# Patient Record
Sex: MALE | Race: WHITE | Employment: FULL TIME | URBAN - METROPOLITAN AREA
[De-identification: names, ages, dates, MRNs, and addresses within clinical notes are randomized per-mention and may not be internally consistent; named-entity substitution may affect disease eponyms.]

---

## 2019-05-15 ENCOUNTER — APPOINTMENT (OUTPATIENT)
Dept: CT IMAGING | Age: 69
DRG: 670 | End: 2019-05-15
Payer: MEDICARE

## 2019-05-15 ENCOUNTER — HOSPITAL ENCOUNTER (INPATIENT)
Age: 69
LOS: 2 days | Discharge: HOME | DRG: 670 | End: 2019-05-17
Attending: EMERGENCY MEDICINE | Admitting: FAMILY MEDICINE
Payer: MEDICARE

## 2019-05-15 DIAGNOSIS — N21.0 BLADDER STONES: ICD-10-CM

## 2019-05-15 DIAGNOSIS — N20.0 KIDNEY STONE: Primary | ICD-10-CM

## 2019-05-15 PROBLEM — K59.00 CONSTIPATION: Status: ACTIVE | Noted: 2019-05-15

## 2019-05-15 PROBLEM — I10 HTN (HYPERTENSION): Status: ACTIVE | Noted: 2019-05-15

## 2019-05-15 PROBLEM — I25.10 CAD (CORONARY ARTERY DISEASE): Status: ACTIVE | Noted: 2019-05-15

## 2019-05-15 PROBLEM — N40.0 BPH (BENIGN PROSTATIC HYPERPLASIA): Status: ACTIVE | Noted: 2019-05-15

## 2019-05-15 PROBLEM — M51.36 DDD (DEGENERATIVE DISC DISEASE), LUMBAR: Status: ACTIVE | Noted: 2019-05-15

## 2019-05-15 LAB
A/G RATIO: 1.5 (ref 1.1–2.2)
ALBUMIN SERPL-MCNC: 4.3 G/DL (ref 3.4–5)
ALP BLD-CCNC: 53 U/L (ref 40–129)
ALT SERPL-CCNC: 28 U/L (ref 10–40)
ANION GAP SERPL CALCULATED.3IONS-SCNC: 13 MMOL/L (ref 3–16)
AST SERPL-CCNC: 23 U/L (ref 15–37)
BASOPHILS ABSOLUTE: 0 K/UL (ref 0–0.2)
BASOPHILS RELATIVE PERCENT: 0.1 %
BILIRUB SERPL-MCNC: 0.3 MG/DL (ref 0–1)
BILIRUBIN URINE: NEGATIVE
BLOOD, URINE: ABNORMAL
BUN BLDV-MCNC: 27 MG/DL (ref 7–20)
CALCIUM SERPL-MCNC: 9.8 MG/DL (ref 8.3–10.6)
CHLORIDE BLD-SCNC: 100 MMOL/L (ref 99–110)
CLARITY: CLEAR
CO2: 23 MMOL/L (ref 21–32)
COLOR: YELLOW
CREAT SERPL-MCNC: 1.2 MG/DL (ref 0.8–1.3)
CRYSTALS, UA: ABNORMAL /HPF
EOSINOPHILS ABSOLUTE: 0 K/UL (ref 0–0.6)
EOSINOPHILS RELATIVE PERCENT: 0 %
EPITHELIAL CELLS, UA: 1 /HPF (ref 0–5)
GFR AFRICAN AMERICAN: >60
GFR NON-AFRICAN AMERICAN: >60
GLOBULIN: 2.8 G/DL
GLUCOSE BLD-MCNC: 168 MG/DL (ref 70–99)
GLUCOSE URINE: 100 MG/DL
HCT VFR BLD CALC: 44.7 % (ref 40.5–52.5)
HEMOGLOBIN: 14.7 G/DL (ref 13.5–17.5)
HYALINE CASTS: 1 /LPF (ref 0–8)
KETONES, URINE: NEGATIVE MG/DL
LEUKOCYTE ESTERASE, URINE: NEGATIVE
LIPASE: 235 U/L (ref 13–60)
LYMPHOCYTES ABSOLUTE: 0.6 K/UL (ref 1–5.1)
LYMPHOCYTES RELATIVE PERCENT: 5.6 %
MCH RBC QN AUTO: 29.9 PG (ref 26–34)
MCHC RBC AUTO-ENTMCNC: 32.8 G/DL (ref 31–36)
MCV RBC AUTO: 91 FL (ref 80–100)
MICROSCOPIC EXAMINATION: YES
MONOCYTES ABSOLUTE: 0.5 K/UL (ref 0–1.3)
MONOCYTES RELATIVE PERCENT: 4.8 %
NEUTROPHILS ABSOLUTE: 8.8 K/UL (ref 1.7–7.7)
NEUTROPHILS RELATIVE PERCENT: 89.5 %
NITRITE, URINE: NEGATIVE
PDW BLD-RTO: 14.2 % (ref 12.4–15.4)
PH UA: 6.5 (ref 5–8)
PLATELET # BLD: 309 K/UL (ref 135–450)
PMV BLD AUTO: 7.7 FL (ref 5–10.5)
POTASSIUM REFLEX MAGNESIUM: 4.1 MMOL/L (ref 3.5–5.1)
PROTEIN UA: NEGATIVE MG/DL
RBC # BLD: 4.91 M/UL (ref 4.2–5.9)
RBC UA: 2 /HPF (ref 0–4)
SODIUM BLD-SCNC: 136 MMOL/L (ref 136–145)
SPECIFIC GRAVITY UA: 1.02 (ref 1–1.03)
TOTAL PROTEIN: 7.1 G/DL (ref 6.4–8.2)
URINE TYPE: ABNORMAL
UROBILINOGEN, URINE: 0.2 E.U./DL
WBC # BLD: 9.9 K/UL (ref 4–11)
WBC UA: 1 /HPF (ref 0–5)

## 2019-05-15 PROCEDURE — 83690 ASSAY OF LIPASE: CPT

## 2019-05-15 PROCEDURE — 81001 URINALYSIS AUTO W/SCOPE: CPT

## 2019-05-15 PROCEDURE — 6370000000 HC RX 637 (ALT 250 FOR IP)

## 2019-05-15 PROCEDURE — 96376 TX/PRO/DX INJ SAME DRUG ADON: CPT

## 2019-05-15 PROCEDURE — 1200000000 HC SEMI PRIVATE

## 2019-05-15 PROCEDURE — 6370000000 HC RX 637 (ALT 250 FOR IP): Performed by: FAMILY MEDICINE

## 2019-05-15 PROCEDURE — 80053 COMPREHEN METABOLIC PANEL: CPT

## 2019-05-15 PROCEDURE — 99285 EMERGENCY DEPT VISIT HI MDM: CPT

## 2019-05-15 PROCEDURE — 96361 HYDRATE IV INFUSION ADD-ON: CPT

## 2019-05-15 PROCEDURE — 85025 COMPLETE CBC W/AUTO DIFF WBC: CPT

## 2019-05-15 PROCEDURE — 6360000004 HC RX CONTRAST MEDICATION: Performed by: EMERGENCY MEDICINE

## 2019-05-15 PROCEDURE — 6360000002 HC RX W HCPCS: Performed by: FAMILY MEDICINE

## 2019-05-15 PROCEDURE — 96374 THER/PROPH/DIAG INJ IV PUSH: CPT

## 2019-05-15 PROCEDURE — 6360000002 HC RX W HCPCS: Performed by: EMERGENCY MEDICINE

## 2019-05-15 PROCEDURE — 94760 N-INVAS EAR/PLS OXIMETRY 1: CPT

## 2019-05-15 PROCEDURE — 2580000003 HC RX 258: Performed by: FAMILY MEDICINE

## 2019-05-15 PROCEDURE — 96375 TX/PRO/DX INJ NEW DRUG ADDON: CPT

## 2019-05-15 PROCEDURE — 2580000003 HC RX 258: Performed by: EMERGENCY MEDICINE

## 2019-05-15 PROCEDURE — 74177 CT ABD & PELVIS W/CONTRAST: CPT

## 2019-05-15 RX ORDER — SODIUM CHLORIDE 0.9 % (FLUSH) 0.9 %
10 SYRINGE (ML) INJECTION EVERY 12 HOURS SCHEDULED
Status: DISCONTINUED | OUTPATIENT
Start: 2019-05-15 | End: 2019-05-17 | Stop reason: HOSPADM

## 2019-05-15 RX ORDER — FENOFIBRATE 160 MG/1
160 TABLET ORAL DAILY
Status: DISCONTINUED | OUTPATIENT
Start: 2019-05-15 | End: 2019-05-17 | Stop reason: HOSPADM

## 2019-05-15 RX ORDER — CLOPIDOGREL BISULFATE 75 MG/1
75 TABLET ORAL DAILY
COMMUNITY

## 2019-05-15 RX ORDER — CYCLOBENZAPRINE HCL 10 MG
10 TABLET ORAL 3 TIMES DAILY PRN
COMMUNITY

## 2019-05-15 RX ORDER — PREDNISONE 1 MG/1
1 TABLET ORAL DAILY
COMMUNITY
End: 2019-05-15 | Stop reason: DRUGHIGH

## 2019-05-15 RX ORDER — POLYETHYLENE GLYCOL 3350 17 G/17G
17 POWDER, FOR SOLUTION ORAL
Status: DISCONTINUED | OUTPATIENT
Start: 2019-05-15 | End: 2019-05-17 | Stop reason: HOSPADM

## 2019-05-15 RX ORDER — AMLODIPINE BESYLATE 5 MG/1
10 TABLET ORAL DAILY
Status: DISCONTINUED | OUTPATIENT
Start: 2019-05-16 | End: 2019-05-17

## 2019-05-15 RX ORDER — HYDROCODONE BITARTRATE AND ACETAMINOPHEN 5; 325 MG/1; MG/1
1 TABLET ORAL EVERY 4 HOURS PRN
Status: DISCONTINUED | OUTPATIENT
Start: 2019-05-15 | End: 2019-05-17 | Stop reason: HOSPADM

## 2019-05-15 RX ORDER — HYDROCODONE BITARTRATE AND ACETAMINOPHEN 5; 325 MG/1; MG/1
2 TABLET ORAL EVERY 4 HOURS PRN
Status: DISCONTINUED | OUTPATIENT
Start: 2019-05-15 | End: 2019-05-17 | Stop reason: HOSPADM

## 2019-05-15 RX ORDER — TAMSULOSIN HYDROCHLORIDE 0.4 MG/1
CAPSULE ORAL
Status: COMPLETED
Start: 2019-05-15 | End: 2019-05-15

## 2019-05-15 RX ORDER — LOSARTAN POTASSIUM 100 MG/1
100 TABLET ORAL DAILY
Status: DISCONTINUED | OUTPATIENT
Start: 2019-05-15 | End: 2019-05-17 | Stop reason: HOSPADM

## 2019-05-15 RX ORDER — METOPROLOL TARTRATE 50 MG/1
50 TABLET, FILM COATED ORAL 2 TIMES DAILY
Status: DISCONTINUED | OUTPATIENT
Start: 2019-05-15 | End: 2019-05-15

## 2019-05-15 RX ORDER — LOSARTAN POTASSIUM 100 MG/1
100 TABLET ORAL DAILY
COMMUNITY

## 2019-05-15 RX ORDER — PREDNISONE 1 MG/1
5 TABLET ORAL SEE ADMIN INSTRUCTIONS
COMMUNITY

## 2019-05-15 RX ORDER — ATORVASTATIN CALCIUM 40 MG/1
40 TABLET, FILM COATED ORAL DAILY
COMMUNITY

## 2019-05-15 RX ORDER — MORPHINE SULFATE 4 MG/ML
4 INJECTION, SOLUTION INTRAMUSCULAR; INTRAVENOUS ONCE
Status: COMPLETED | OUTPATIENT
Start: 2019-05-15 | End: 2019-05-15

## 2019-05-15 RX ORDER — CLOPIDOGREL BISULFATE 75 MG/1
75 TABLET ORAL DAILY
Status: DISCONTINUED | OUTPATIENT
Start: 2019-05-15 | End: 2019-05-17 | Stop reason: HOSPADM

## 2019-05-15 RX ORDER — HYDRALAZINE HYDROCHLORIDE 20 MG/ML
10 INJECTION INTRAMUSCULAR; INTRAVENOUS EVERY 6 HOURS PRN
Status: DISCONTINUED | OUTPATIENT
Start: 2019-05-15 | End: 2019-05-17 | Stop reason: HOSPADM

## 2019-05-15 RX ORDER — PREDNISONE 1 MG/1
5 TABLET ORAL SEE ADMIN INSTRUCTIONS
Status: DISCONTINUED | OUTPATIENT
Start: 2019-05-15 | End: 2019-05-15

## 2019-05-15 RX ORDER — FENOFIBRATE 160 MG/1
160 TABLET ORAL DAILY
COMMUNITY

## 2019-05-15 RX ORDER — ONDANSETRON 2 MG/ML
4 INJECTION INTRAMUSCULAR; INTRAVENOUS ONCE
Status: COMPLETED | OUTPATIENT
Start: 2019-05-15 | End: 2019-05-15

## 2019-05-15 RX ORDER — CYCLOBENZAPRINE HCL 10 MG
10 TABLET ORAL 3 TIMES DAILY PRN
Status: DISCONTINUED | OUTPATIENT
Start: 2019-05-15 | End: 2019-05-17 | Stop reason: HOSPADM

## 2019-05-15 RX ORDER — AMLODIPINE BESYLATE 5 MG/1
5 TABLET ORAL DAILY
Status: DISCONTINUED | OUTPATIENT
Start: 2019-05-15 | End: 2019-05-15

## 2019-05-15 RX ORDER — ONDANSETRON 2 MG/ML
4 INJECTION INTRAMUSCULAR; INTRAVENOUS EVERY 6 HOURS PRN
Status: DISCONTINUED | OUTPATIENT
Start: 2019-05-15 | End: 2019-05-17 | Stop reason: HOSPADM

## 2019-05-15 RX ORDER — MORPHINE SULFATE 4 MG/ML
4 INJECTION, SOLUTION INTRAMUSCULAR; INTRAVENOUS
Status: DISCONTINUED | OUTPATIENT
Start: 2019-05-15 | End: 2019-05-17 | Stop reason: HOSPADM

## 2019-05-15 RX ORDER — SODIUM CHLORIDE 9 MG/ML
INJECTION, SOLUTION INTRAVENOUS CONTINUOUS
Status: DISCONTINUED | OUTPATIENT
Start: 2019-05-15 | End: 2019-05-16

## 2019-05-15 RX ORDER — TAMSULOSIN HYDROCHLORIDE 0.4 MG/1
0.4 CAPSULE ORAL DAILY
Status: DISCONTINUED | OUTPATIENT
Start: 2019-05-16 | End: 2019-05-16

## 2019-05-15 RX ORDER — MORPHINE SULFATE 2 MG/ML
2 INJECTION, SOLUTION INTRAMUSCULAR; INTRAVENOUS
Status: DISCONTINUED | OUTPATIENT
Start: 2019-05-15 | End: 2019-05-17 | Stop reason: HOSPADM

## 2019-05-15 RX ORDER — ATORVASTATIN CALCIUM 40 MG/1
40 TABLET, FILM COATED ORAL NIGHTLY
Status: DISCONTINUED | OUTPATIENT
Start: 2019-05-15 | End: 2019-05-17 | Stop reason: HOSPADM

## 2019-05-15 RX ORDER — DOXAZOSIN MESYLATE 4 MG/1
4 TABLET ORAL NIGHTLY
COMMUNITY

## 2019-05-15 RX ORDER — TAMSULOSIN HYDROCHLORIDE 0.4 MG/1
0.8 CAPSULE ORAL ONCE
Status: COMPLETED | OUTPATIENT
Start: 2019-05-15 | End: 2019-05-15

## 2019-05-15 RX ORDER — PREDNISONE 10 MG/1
10 TABLET ORAL DAILY
Status: COMPLETED | OUTPATIENT
Start: 2019-05-15 | End: 2019-05-17

## 2019-05-15 RX ORDER — SODIUM CHLORIDE 0.9 % (FLUSH) 0.9 %
10 SYRINGE (ML) INJECTION PRN
Status: DISCONTINUED | OUTPATIENT
Start: 2019-05-15 | End: 2019-05-17 | Stop reason: HOSPADM

## 2019-05-15 RX ORDER — METOPROLOL TARTRATE 50 MG/1
50 TABLET, FILM COATED ORAL DAILY
COMMUNITY

## 2019-05-15 RX ORDER — ACETAMINOPHEN 325 MG/1
650 TABLET ORAL EVERY 4 HOURS PRN
Status: DISCONTINUED | OUTPATIENT
Start: 2019-05-15 | End: 2019-05-17 | Stop reason: HOSPADM

## 2019-05-15 RX ORDER — SENNA AND DOCUSATE SODIUM 50; 8.6 MG/1; MG/1
2 TABLET, FILM COATED ORAL 2 TIMES DAILY
Status: COMPLETED | OUTPATIENT
Start: 2019-05-15 | End: 2019-05-16

## 2019-05-15 RX ORDER — 0.9 % SODIUM CHLORIDE 0.9 %
1000 INTRAVENOUS SOLUTION INTRAVENOUS ONCE
Status: COMPLETED | OUTPATIENT
Start: 2019-05-15 | End: 2019-05-15

## 2019-05-15 RX ORDER — AMLODIPINE BESYLATE 5 MG/1
5 TABLET ORAL DAILY
COMMUNITY

## 2019-05-15 RX ADMIN — SODIUM CHLORIDE 1000 ML: 9 INJECTION, SOLUTION INTRAVENOUS at 08:48

## 2019-05-15 RX ADMIN — Medication 10 ML: at 20:59

## 2019-05-15 RX ADMIN — POLYETHYLENE GLYCOL 3350 17 G: 17 POWDER, FOR SOLUTION ORAL at 14:12

## 2019-05-15 RX ADMIN — MAGNESIUM HYDROXIDE 30 ML: 400 SUSPENSION ORAL at 18:46

## 2019-05-15 RX ADMIN — CLOPIDOGREL 75 MG: 75 TABLET, FILM COATED ORAL at 14:02

## 2019-05-15 RX ADMIN — MORPHINE SULFATE 4 MG: 4 INJECTION INTRAVENOUS at 10:52

## 2019-05-15 RX ADMIN — MORPHINE SULFATE 2 MG: 2 INJECTION, SOLUTION INTRAMUSCULAR; INTRAVENOUS at 14:12

## 2019-05-15 RX ADMIN — IOPAMIDOL 75 ML: 755 INJECTION, SOLUTION INTRAVENOUS at 09:40

## 2019-05-15 RX ADMIN — ENOXAPARIN SODIUM 40 MG: 40 INJECTION SUBCUTANEOUS at 20:59

## 2019-05-15 RX ADMIN — Medication 10 ML: at 15:54

## 2019-05-15 RX ADMIN — MORPHINE SULFATE 4 MG: 4 INJECTION INTRAVENOUS at 08:48

## 2019-05-15 RX ADMIN — TAMSULOSIN HYDROCHLORIDE 0.8 MG: 0.4 CAPSULE ORAL at 12:23

## 2019-05-15 RX ADMIN — FENOFIBRATE 160 MG: 160 TABLET ORAL at 14:02

## 2019-05-15 RX ADMIN — PREDNISONE 10 MG: 10 TABLET ORAL at 15:59

## 2019-05-15 RX ADMIN — Medication 10 ML: at 14:03

## 2019-05-15 RX ADMIN — ONDANSETRON 4 MG: 2 INJECTION INTRAMUSCULAR; INTRAVENOUS at 08:48

## 2019-05-15 RX ADMIN — LOSARTAN POTASSIUM 100 MG: 100 TABLET, FILM COATED ORAL at 14:03

## 2019-05-15 RX ADMIN — HYDRALAZINE HYDROCHLORIDE 10 MG: 20 INJECTION INTRAMUSCULAR; INTRAVENOUS at 15:53

## 2019-05-15 RX ADMIN — SODIUM CHLORIDE: 9 INJECTION, SOLUTION INTRAVENOUS at 14:13

## 2019-05-15 RX ADMIN — DESMOPRESSIN ACETATE 40 MG: 0.2 TABLET ORAL at 20:59

## 2019-05-15 RX ADMIN — SENNOSIDES AND DOCUSATE SODIUM 2 TABLET: 8.6; 5 TABLET ORAL at 21:00

## 2019-05-15 RX ADMIN — AMLODIPINE BESYLATE 5 MG: 5 TABLET ORAL at 14:02

## 2019-05-15 SDOH — HEALTH STABILITY: MENTAL HEALTH: HOW OFTEN DO YOU HAVE A DRINK CONTAINING ALCOHOL?: NEVER

## 2019-05-15 ASSESSMENT — PAIN SCALES - GENERAL
PAINLEVEL_OUTOF10: 7
PAINLEVEL_OUTOF10: 0
PAINLEVEL_OUTOF10: 2
PAINLEVEL_OUTOF10: 7
PAINLEVEL_OUTOF10: 0
PAINLEVEL_OUTOF10: 6
PAINLEVEL_OUTOF10: 0
PAINLEVEL_OUTOF10: 3
PAINLEVEL_OUTOF10: 4
PAINLEVEL_OUTOF10: 0
PAINLEVEL_OUTOF10: 8
PAINLEVEL_OUTOF10: 7
PAINLEVEL_OUTOF10: 0
PAINLEVEL_OUTOF10: 0

## 2019-05-15 ASSESSMENT — PAIN DESCRIPTION - PAIN TYPE
TYPE: ACUTE PAIN

## 2019-05-15 ASSESSMENT — PAIN DESCRIPTION - DESCRIPTORS
DESCRIPTORS: DISCOMFORT
DESCRIPTORS: DISCOMFORT

## 2019-05-15 ASSESSMENT — PAIN DESCRIPTION - PROGRESSION
CLINICAL_PROGRESSION: RAPIDLY IMPROVING
CLINICAL_PROGRESSION: GRADUALLY IMPROVING

## 2019-05-15 ASSESSMENT — PAIN DESCRIPTION - LOCATION
LOCATION: ABDOMEN

## 2019-05-15 ASSESSMENT — PAIN DESCRIPTION - FREQUENCY: FREQUENCY: INTERMITTENT

## 2019-05-15 ASSESSMENT — PAIN DESCRIPTION - ORIENTATION
ORIENTATION: LEFT

## 2019-05-15 ASSESSMENT — PAIN - FUNCTIONAL ASSESSMENT: PAIN_FUNCTIONAL_ASSESSMENT: 0-10

## 2019-05-15 NOTE — ED NOTES
Pharmacy Medication History Note      List of current medications patient is taking is complete. Source of information: [employer]    Changes made to medication list:  Medications flagged for removal (include reason, ex. noncompliance):  none    Medications removed (include reason, ex. therapy complete or physician discontinued):  Prednisone- dose adjustment    Medications added/doses adjusted:  Prednisone taper- started 5/10/19    Other notes (ex. Recent course of antibiotics, Coumadin dosing):  Denies use of other OTC or herbal medications. Last dose times updated. Levi Ramirez, PharmD  ED Pharmacist [id number]      No current facility-administered medications on file prior to encounter. Current Outpatient Medications on File Prior to Encounter   Medication Sig Dispense Refill    losartan (COZAAR) 100 MG tablet Take 100 mg by mouth daily      doxazosin (CARDURA) 4 MG tablet Take 4 mg by mouth nightly      amLODIPine (NORVASC) 5 MG tablet Take 5 mg by mouth daily      fenofibrate 160 MG tablet Take 160 mg by mouth daily      clopidogrel (PLAVIX) 75 MG tablet Take 75 mg by mouth daily      atorvastatin (LIPITOR) 40 MG tablet Take 40 mg by mouth daily      metoprolol tartrate (LOPRESSOR) 50 MG tablet Take 50 mg by mouth 2 times daily      cyclobenzaprine (FLEXERIL) 10 MG tablet Take 10 mg by mouth 3 times daily as needed for Muscle spasms      predniSONE (DELTASONE) 5 MG tablet Take 5 mg by mouth See Admin Instructions Per prednisone taper instructions.       [DISCONTINUED] predniSONE (DELTASONE) 1 MG tablet Take 1 mg by mouth daily

## 2019-05-15 NOTE — CONSULTS
CC:   Chief Complaint   Patient presents with    Flank Pain     left flank pain worsening last night. hx of divertic. HPI: Jose Carlos Vyas is a 80 y.o. male. I saw the patient today for left ureter stone, bladder stones, BPH, and associated symptoms of flank pain, that have been present for  1 day. Symptoms relieved by pain meds and aggravated by noth. He has tried the following treatments: noth. Past medical History:   He has a past medical history of Hyperlipidemia and Hypertension. Past Surgical History:  He has a past surgical history that includes Cardiac surgery. Allergies: Allergies   Allergen Reactions    Asa [Aspirin] Anaphylaxis       Social History:  He reports that he has never smoked. He has never used smokeless tobacco. He reports that he does not drink alcohol. Family History:  family history is not on file. Medications:   Scheduled Meds:  Continuous Infusions:  PRN Meds:    Review of Systems:  Constitutional: Negative for fever    Genitourinary: see HPI  Eyes: negative for sudden change in vision  EENT: no complaints  Cardiovascular: Negative for chest pain  Respiratory: Negative for shortness of breath  Gastrointestinal: Negative for nausea  Musculoskeletal: + back pain   Neurological: Negative for weakness  Psychiatric: Negative for anxiety  Integumentary: Negative for rashes or adenopathy     Physical Exam:  Vitals:    05/15/19 0900   BP: (!) 194/80   Pulse: 65   Resp: 20   Temp:    SpO2: (!) 88%     Constitutional: NAD, well-developed, well-nourished. HEENT: MMM. Hearing intact. PERRL  Neck: no thyroid masses appreciated. Trachea is midline. Neck appears unremarkable   Lymph: no palpable adenopathy in supraclavicular, or axillary lymph nodes  Cardiovascular: Regular rate. No peripheral edema  Respiratory: Respirations are even and non-labored. No audible breath sounds. Genitourinary:no CVAT, ERAN def per pt  Abdomen: Soft.  No distension, tenderness, hernias, masses or guarding. No CVA tenderness. No hernias appreciated. Liver and spleen appear normal  Psychiatric: A + O x 3, normal affect. Insight appears intact. Muskuloskeletal: REINIER x 4   Skin: Pink, warm and dry. No rashes on face and arms.     Labs:  Lab Results   Component Value Date    WBC 9.9 05/15/2019    HGB 14.7 05/15/2019    HCT 44.7 05/15/2019    MCV 91.0 05/15/2019     05/15/2019     Lab Results   Component Value Date    CREATININE 1.2 05/15/2019    BUN 27 (H) 05/15/2019     05/15/2019    K 4.1 05/15/2019     05/15/2019    CO2 23 05/15/2019     No results found for: PSA     Imaging:   reviewed    Татьяна Gerber MD  5/15/2019

## 2019-05-15 NOTE — ED NOTES
Report given to [employer] and Dwaine Crabtree RN from . Patient alert and oriented. Patient transferred to floor by [employer] and Dwaine Crabtree RN from  in wheelchair. Pt states wife has his wallet, keys and cellphone. pts wife is carrying 1 purse and 1 bag on shoulder. pts home clothing in belongings bag and transported with pt to . Skin appropriate for ethnicity, dry and intact. No signs of distress. Regular respiratory pattern, normal respiratory depth, unlabored respirations.          Tika Desai RN  05/15/19 9845

## 2019-05-15 NOTE — ED NOTES
Pt states abdominal pain started last night 2300. Nausea, denies v/d. New hx of DDD lumbar and lumbar nerve issues. Pt lives out of town. Wife at bedside. Pt is on a continuous pulse oximetry and telemetry monitoring. Pt continued on cycling blood pressure. Fall risk precautions in place, call light in reach, bed side table within reach, will continue to monitor. Warm blanket provided to pt.           Michell Nobles RN  05/15/19 9658

## 2019-05-15 NOTE — FLOWSHEET NOTE
05/15/19 1545   Vital Signs   Temp 97.6 °F (36.4 °C)   Temp Source Temporal   Pulse 82   Heart Rate Source Monitor   Resp 18   BP (!) 168/78   BP Location Right upper arm   BP Upper/Lower Upper     BP elevated. Gave PRN Hydralazine. Will monitor.

## 2019-05-15 NOTE — LETTER
[address] [address]  Phone: [phone]    General Surgery Department  Surgeon: Dr. Susanne Lynn      May 16, 2019     Patient: Stefanie Boxer   YOB: 1950   Date of Visit: 5/15/2019       To Whom it May Concern:    Carine Fregoso was seen at [facility] for a surgical procedure today. If you have any questions or concerns, please don't hesitate to call.     Sincerely,         Merary Key RN, BSN

## 2019-05-15 NOTE — H&P
Historical Provider, MD   predniSONE (DELTASONE) 5 MG tablet Take 5 mg by mouth See Admin Instructions Per prednisone taper instructions. Yes Historical Provider, MD       Allergy(ies):  Hannah Rands [aspirin]    Social History:  TOBACCO:  reports that he has never smoked. He has never used smokeless tobacco.  ETOH:  reports that he does not drink alcohol. Family History:  History reviewed. No pertinent family history. Review of Systems:  All other systems are reviewed, positive and negative are noted in HPI. Vitals and physical examination:  BP (!) 162/76   Pulse 58   Temp 97.6 °F (36.4 °C) (Temporal)   Resp 20   Ht 5' 9\" (1.753 m)   Wt 197 lb 12.8 oz (89.7 kg)   SpO2 97%   BMI 29.21 kg/m²   Gen/overall appearance: Not in acute distress. Alert. Head: Normocephalic, atraumatic  Eyes: EOMI, good acuity  ENT:- Oral mucosa moist  Neck: No JVD, thyromegaly  CVS: Nml S1S2, no MRG, RRR  Pulm: Clear bilaterally. No crackles/wheezes  Gastrointestinal: Soft, NT/ND, +BS  Musculoskeletal: No edema. Warm  Neuro: No focal deficit. Moves extremity spontaneously. Psychiatry: Appropriate affect. Not agitated. Skin: Warm, dry with normal turgor. No rash    Labs/imaging/EKG:  CBC:   Recent Labs     05/15/19  0851   WBC 9.9   HGB 14.7        BMP:    Recent Labs     05/15/19  0851      K 4.1      CO2 23   BUN 27*   CREATININE 1.2   GLUCOSE 168*     Hepatic:   Recent Labs     05/15/19  0851   AST 23   ALT 28   BILITOT 0.3   ALKPHOS 53       Ct Abdomen Pelvis W Iv Contrast Additional Contrast? None    Result Date: 5/15/2019  EXAMINATION: CT OF THE ABDOMEN AND PELVIS WITH CONTRAST 5/15/2019 9:40 am TECHNIQUE: CT of the abdomen and pelvis was performed with the administration of intravenous contrast. Multiplanar reformatted images are provided for review.  Dose modulation, iterative reconstruction, and/or weight based adjustment of the mA/kV was utilized to reduce the radiation dose to as low as reasonably w/ left hydronephrosis  - Urology following, plan for OR tomm am  - flomax, IVF, prn pain meds      HTN  - BP variable, increase norvasc to 10mg QD, cont losartan 100mg QD, decrease lopressor to 12.5mg BID, hold doxazosin while on flomax, prn hyralzine, CTM      CAD  - on plavix, statin, BB      BPH  - started on flomax      DDD  - complete steroid course      Constipation  - start laxatives       Activities: Up with assist  Prophylaxis: lovenox  Code status: Full    ==========================================================          Thank you No primary care provider on file. for the opportunity to be involved in this patient's care.  If you have any questions or concerns please feel free to contact me at [phone].  -----------------------------  June Feliciano MD  [facility] hospitalist

## 2019-05-15 NOTE — ED PROVIDER NOTES
CHIEF COMPLAINT  Flank Pain (left flank pain worsening last night. hx of divertic.)      HISTORY OF PRESENT ILLNESS  Jeremy Ramey is a 80 y.o. male presents to the ED with ppain in his left side. 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 A recent MRI done in [place] where he lives revealed  Some degenerative changes. Last night he started feeling nauseous, has had  Pain in the slightly different area in the left flank and abdominal fullness wrapping around to his left abdomen. He consulted with his doctor in the concern was for diverticulitis or kidney stone. Reginaldo Tena He's had diverticulitis in the past before, no kidney stones. He felt a little warm, no definite fever. He had nausea but no vomiting. He has been constipated recently. He has had no rash. No other complaints, modifying factors or associated symptoms. I have reviewed the following from the nursing documentation. No past medical history on file. No past surgical history on file. No family history on file.   Social History     Socioeconomic History    Marital status: Not on file     Spouse name: Not on file    Number of children: Not on file    Years of education: Not on file    Highest education level: Not on file   Occupational History    Not on file   Social Needs    Financial resource strain: Not on file    Food insecurity:     Worry: Not on file     Inability: Not on file    Transportation needs:     Medical: Not on file     Non-medical: Not on file   Tobacco Use    Smoking status: Not on file   Substance and Sexual Activity    Alcohol use: Not on file    Drug use: Not on file    Sexual activity: Not on file   Lifestyle    Physical activity:     Days per week: Not on file     Minutes per session: Not on file    Stress: Not on file   Relationships    Social connections:     Talks on phone: Not on file     Gets together: Not on file hospital encounter of 05/15/19   CBC Auto Differential   Result Value Ref Range    WBC 9.9 4.0 - 11.0 K/uL    RBC 4.91 4.20 - 5.90 M/uL    Hemoglobin 14.7 13.5 - 17.5 g/dL    Hematocrit 44.7 40.5 - 52.5 %    MCV 91.0 80.0 - 100.0 fL    MCH 29.9 26.0 - 34.0 pg    MCHC 32.8 31.0 - 36.0 g/dL    RDW 14.2 12.4 - 15.4 %    Platelets 050 779 - 673 K/uL    MPV 7.7 5.0 - 10.5 fL    Neutrophils % 89.5 %    Lymphocytes % 5.6 %    Monocytes % 4.8 %    Eosinophils % 0.0 %    Basophils % 0.1 %    Neutrophils # 8.8 (H) 1.7 - 7.7 K/uL    Lymphocytes # 0.6 (L) 1.0 - 5.1 K/uL    Monocytes # 0.5 0.0 - 1.3 K/uL    Eosinophils # 0.0 0.0 - 0.6 K/uL    Basophils # 0.0 0.0 - 0.2 K/uL   Comprehensive Metabolic Panel w/ Reflex to MG   Result Value Ref Range    Sodium 136 136 - 145 mmol/L    Potassium reflex Magnesium 4.1 3.5 - 5.1 mmol/L    Chloride 100 99 - 110 mmol/L    CO2 23 21 - 32 mmol/L    Anion Gap 13 3 - 16    Glucose 168 (H) 70 - 99 mg/dL    BUN 27 (H) 7 - 20 mg/dL    CREATININE 1.2 0.8 - 1.3 mg/dL    GFR Non-African American >60 >60    GFR African American >60 >60    Calcium 9.8 8.3 - 10.6 mg/dL    Total Protein 7.1 6.4 - 8.2 g/dL    Alb 4.3 3.4 - 5.0 g/dL    Albumin/Globulin Ratio 1.5 1.1 - 2.2    Total Bilirubin 0.3 0.0 - 1.0 mg/dL    Alkaline Phosphatase 53 40 - 129 U/L    ALT 28 10 - 40 U/L    AST 23 15 - 37 U/L    Globulin 2.8 g/dL   Lipase   Result Value Ref Range    Lipase 235.0 (H) 13.0 - 60.0 U/L   Urinalysis, reflex to microscopic   Result Value Ref Range    Color, UA YELLOW Straw/Yellow    Clarity, UA Clear Clear    Glucose, Ur 100 (A) Negative mg/dL    Bilirubin Urine Negative Negative    Ketones, Urine Negative Negative mg/dL    Specific Gravity, UA 1.018 1.005 - 1.030    Blood, Urine MODERATE (A) Negative    pH, UA 6.5 5.0 - 8.0    Protein, UA Negative Negative mg/dL    Urobilinogen, Urine 0.2 <2.0 E.U./dL    Nitrite, Urine Negative Negative    Leukocyte Esterase, Urine Negative Negative    Microscopic Examination YES     Urine Type Voided    Microscopic Urinalysis   Result Value Ref Range    Crystals 1+ Ca. Oxalate (A) /HPF    Hyaline Casts, UA 1 0 - 8 /LPF    WBC, UA 1 0 - 5 /HPF    RBC, UA 2 0 - 4 /HPF    Epi Cells 1 0 - 5 /HPF           RADIOLOGY  Ct Abdomen Pelvis W Iv Contrast Additional Contrast? None    Result Date: 5/15/2019  EXAMINATION: CT OF THE ABDOMEN AND PELVIS WITH CONTRAST 5/15/2019 9:40 am TECHNIQUE: CT of the abdomen and pelvis was performed with the administration of intravenous contrast. Multiplanar reformatted images are provided for review. Dose modulation, iterative reconstruction, and/or weight based adjustment of the mA/kV was utilized to reduce the radiation dose to as low as reasonably achievable. COMPARISON: None. HISTORY: ORDERING SYSTEM PROVIDED HISTORY: LLQ pain, L flank pain, eval for diverticulitis TECHNOLOGIST PROVIDED HISTORY: Additional Contrast?->None Ordering Physician Provided Reason for Exam: LLQ pain, L flank pain, eval for diverticulitis Acuity: Unknown Type of Exam: Unknown FINDINGS: Lower Chest: Visualized portions of the lower thorax are unremarkable. Organs: Liver, spleen, pancreas, and adrenal glands are unremarkable. No radiodense gallstones. Kidneys: 1.9 cm hypodensity in the superior right kidney demonstrates fluid attenuation and is compatible with a cyst.  No additional right kidney lesions. No right kidney stones. 3 mm stone in the mid left ureter with moderate left hydroureteronephrosis. No additional left kidney stones. No focal lesions in the left kidney. Moderate left perinephric stranding and fluid. GI/Bowel: No bowel obstruction. Normal appendix. Colonic diverticulosis without evidence of diverticulitis. Pelvis: Multiple layering bladder stones measuring up to 9 mm. Moderately enlarged prostate gland. Peritoneum/Retroperitoneum: Few prominent and mildly enlarged retroperitoneal lymph nodes are nonspecific. No free air.  Bones/Soft Tissues: Degenerative

## 2019-05-16 ENCOUNTER — ANESTHESIA (OUTPATIENT)
Dept: OPERATING ROOM | Age: 69
DRG: 670 | End: 2019-05-16
Payer: MEDICARE

## 2019-05-16 ENCOUNTER — APPOINTMENT (OUTPATIENT)
Dept: GENERAL RADIOLOGY | Age: 69
DRG: 670 | End: 2019-05-16
Payer: MEDICARE

## 2019-05-16 ENCOUNTER — ANESTHESIA EVENT (OUTPATIENT)
Dept: OPERATING ROOM | Age: 69
DRG: 670 | End: 2019-05-16
Payer: MEDICARE

## 2019-05-16 VITALS
OXYGEN SATURATION: 100 % | SYSTOLIC BLOOD PRESSURE: 171 MMHG | DIASTOLIC BLOOD PRESSURE: 90 MMHG | RESPIRATION RATE: 14 BRPM | TEMPERATURE: 97.5 F

## 2019-05-16 LAB
ANION GAP SERPL CALCULATED.3IONS-SCNC: 9 MMOL/L (ref 3–16)
BUN BLDV-MCNC: 17 MG/DL (ref 7–20)
CALCIUM SERPL-MCNC: 8.8 MG/DL (ref 8.3–10.6)
CHLORIDE BLD-SCNC: 107 MMOL/L (ref 99–110)
CO2: 24 MMOL/L (ref 21–32)
CREAT SERPL-MCNC: 1 MG/DL (ref 0.8–1.3)
GFR AFRICAN AMERICAN: >60
GFR NON-AFRICAN AMERICAN: >60
GLUCOSE BLD-MCNC: 121 MG/DL (ref 70–99)
HCT VFR BLD CALC: 38.4 % (ref 40.5–52.5)
HEMOGLOBIN: 12.8 G/DL (ref 13.5–17.5)
MCH RBC QN AUTO: 30.2 PG (ref 26–34)
MCHC RBC AUTO-ENTMCNC: 33.4 G/DL (ref 31–36)
MCV RBC AUTO: 90.6 FL (ref 80–100)
PDW BLD-RTO: 14.5 % (ref 12.4–15.4)
PLATELET # BLD: 262 K/UL (ref 135–450)
PMV BLD AUTO: 7.3 FL (ref 5–10.5)
POTASSIUM REFLEX MAGNESIUM: 4 MMOL/L (ref 3.5–5.1)
RBC # BLD: 4.24 M/UL (ref 4.2–5.9)
SODIUM BLD-SCNC: 140 MMOL/L (ref 136–145)
WBC # BLD: 9.7 K/UL (ref 4–11)

## 2019-05-16 PROCEDURE — 1200000000 HC SEMI PRIVATE

## 2019-05-16 PROCEDURE — C1758 CATHETER, URETERAL: HCPCS | Performed by: UROLOGY

## 2019-05-16 PROCEDURE — 2580000003 HC RX 258: Performed by: UROLOGY

## 2019-05-16 PROCEDURE — 36415 COLL VENOUS BLD VENIPUNCTURE: CPT

## 2019-05-16 PROCEDURE — 3700000000 HC ANESTHESIA ATTENDED CARE: Performed by: UROLOGY

## 2019-05-16 PROCEDURE — C1769 GUIDE WIRE: HCPCS | Performed by: UROLOGY

## 2019-05-16 PROCEDURE — 3600000004 HC SURGERY LEVEL 4 BASE: Performed by: UROLOGY

## 2019-05-16 PROCEDURE — 6360000004 HC RX CONTRAST MEDICATION: Performed by: UROLOGY

## 2019-05-16 PROCEDURE — 7100000000 HC PACU RECOVERY - FIRST 15 MIN: Performed by: UROLOGY

## 2019-05-16 PROCEDURE — 6370000000 HC RX 637 (ALT 250 FOR IP): Performed by: FAMILY MEDICINE

## 2019-05-16 PROCEDURE — 2580000003 HC RX 258: Performed by: NURSE ANESTHETIST, CERTIFIED REGISTERED

## 2019-05-16 PROCEDURE — 6370000000 HC RX 637 (ALT 250 FOR IP): Performed by: UROLOGY

## 2019-05-16 PROCEDURE — 0TCB8ZZ EXTIRPATION OF MATTER FROM BLADDER, VIA NATURAL OR ARTIFICIAL OPENING ENDOSCOPIC: ICD-10-PCS | Performed by: UROLOGY

## 2019-05-16 PROCEDURE — BT1F1ZZ FLUOROSCOPY OF LEFT KIDNEY, URETER AND BLADDER USING LOW OSMOLAR CONTRAST: ICD-10-PCS | Performed by: UROLOGY

## 2019-05-16 PROCEDURE — 2720000010 HC SURG SUPPLY STERILE: Performed by: UROLOGY

## 2019-05-16 PROCEDURE — 7100000001 HC PACU RECOVERY - ADDTL 15 MIN: Performed by: UROLOGY

## 2019-05-16 PROCEDURE — 2709999900 HC NON-CHARGEABLE SUPPLY: Performed by: UROLOGY

## 2019-05-16 PROCEDURE — 6360000002 HC RX W HCPCS: Performed by: NURSE ANESTHETIST, CERTIFIED REGISTERED

## 2019-05-16 PROCEDURE — 85027 COMPLETE CBC AUTOMATED: CPT

## 2019-05-16 PROCEDURE — C1726 CATH, BAL DIL, NON-VASCULAR: HCPCS | Performed by: UROLOGY

## 2019-05-16 PROCEDURE — 80048 BASIC METABOLIC PNL TOTAL CA: CPT

## 2019-05-16 PROCEDURE — 3600000014 HC SURGERY LEVEL 4 ADDTL 15MIN: Performed by: UROLOGY

## 2019-05-16 PROCEDURE — 2500000003 HC RX 250 WO HCPCS: Performed by: NURSE ANESTHETIST, CERTIFIED REGISTERED

## 2019-05-16 PROCEDURE — 82365 CALCULUS SPECTROSCOPY: CPT

## 2019-05-16 PROCEDURE — 93005 ELECTROCARDIOGRAM TRACING: CPT | Performed by: FAMILY MEDICINE

## 2019-05-16 PROCEDURE — 3700000001 HC ADD 15 MINUTES (ANESTHESIA): Performed by: UROLOGY

## 2019-05-16 PROCEDURE — 74420 UROGRAPHY RTRGR +-KUB: CPT

## 2019-05-16 PROCEDURE — 6360000002 HC RX W HCPCS: Performed by: UROLOGY

## 2019-05-16 PROCEDURE — 0TC78ZZ EXTIRPATION OF MATTER FROM LEFT URETER, VIA NATURAL OR ARTIFICIAL OPENING ENDOSCOPIC: ICD-10-PCS | Performed by: UROLOGY

## 2019-05-16 RX ORDER — SODIUM CHLORIDE 9 MG/ML
INJECTION, SOLUTION INTRAVENOUS CONTINUOUS PRN
Status: DISCONTINUED | OUTPATIENT
Start: 2019-05-16 | End: 2019-05-16 | Stop reason: SDUPTHER

## 2019-05-16 RX ORDER — ONDANSETRON 2 MG/ML
INJECTION INTRAMUSCULAR; INTRAVENOUS PRN
Status: DISCONTINUED | OUTPATIENT
Start: 2019-05-16 | End: 2019-05-16 | Stop reason: SDUPTHER

## 2019-05-16 RX ORDER — CEFAZOLIN SODIUM 2 G/100ML
2 INJECTION, SOLUTION INTRAVENOUS ONCE
Status: COMPLETED | OUTPATIENT
Start: 2019-05-16 | End: 2019-05-16

## 2019-05-16 RX ORDER — MAGNESIUM HYDROXIDE 1200 MG/15ML
LIQUID ORAL
Status: COMPLETED | OUTPATIENT
Start: 2019-05-16 | End: 2019-05-16

## 2019-05-16 RX ORDER — CEFAZOLIN SODIUM 2 G/100ML
INJECTION, SOLUTION INTRAVENOUS
Status: COMPLETED
Start: 2019-05-16 | End: 2019-05-16

## 2019-05-16 RX ORDER — HYDRALAZINE HYDROCHLORIDE 20 MG/ML
5 INJECTION INTRAMUSCULAR; INTRAVENOUS EVERY 10 MIN PRN
Status: DISCONTINUED | OUTPATIENT
Start: 2019-05-16 | End: 2019-05-16 | Stop reason: HOSPADM

## 2019-05-16 RX ORDER — HYDROMORPHONE HCL 110MG/55ML
0.5 PATIENT CONTROLLED ANALGESIA SYRINGE INTRAVENOUS EVERY 5 MIN PRN
Status: DISCONTINUED | OUTPATIENT
Start: 2019-05-16 | End: 2019-05-16 | Stop reason: HOSPADM

## 2019-05-16 RX ORDER — FENTANYL CITRATE 50 UG/ML
25 INJECTION, SOLUTION INTRAMUSCULAR; INTRAVENOUS EVERY 5 MIN PRN
Status: DISCONTINUED | OUTPATIENT
Start: 2019-05-16 | End: 2019-05-16 | Stop reason: HOSPADM

## 2019-05-16 RX ORDER — SODIUM CHLORIDE 0.9 % (FLUSH) 0.9 %
10 SYRINGE (ML) INJECTION EVERY 12 HOURS SCHEDULED
Status: DISCONTINUED | OUTPATIENT
Start: 2019-05-16 | End: 2019-05-16 | Stop reason: HOSPADM

## 2019-05-16 RX ORDER — ONDANSETRON 2 MG/ML
4 INJECTION INTRAMUSCULAR; INTRAVENOUS
Status: DISCONTINUED | OUTPATIENT
Start: 2019-05-16 | End: 2019-05-16 | Stop reason: HOSPADM

## 2019-05-16 RX ORDER — PROPOFOL 10 MG/ML
INJECTION, EMULSION INTRAVENOUS PRN
Status: DISCONTINUED | OUTPATIENT
Start: 2019-05-16 | End: 2019-05-16 | Stop reason: SDUPTHER

## 2019-05-16 RX ORDER — DEXAMETHASONE SODIUM PHOSPHATE 4 MG/ML
INJECTION, SOLUTION INTRA-ARTICULAR; INTRALESIONAL; INTRAMUSCULAR; INTRAVENOUS; SOFT TISSUE PRN
Status: DISCONTINUED | OUTPATIENT
Start: 2019-05-16 | End: 2019-05-16 | Stop reason: SDUPTHER

## 2019-05-16 RX ORDER — FENTANYL CITRATE 50 UG/ML
INJECTION, SOLUTION INTRAMUSCULAR; INTRAVENOUS PRN
Status: DISCONTINUED | OUTPATIENT
Start: 2019-05-16 | End: 2019-05-16 | Stop reason: SDUPTHER

## 2019-05-16 RX ORDER — FINASTERIDE 5 MG/1
5 TABLET, FILM COATED ORAL DAILY
Status: DISCONTINUED | OUTPATIENT
Start: 2019-05-16 | End: 2019-05-17

## 2019-05-16 RX ORDER — SODIUM CHLORIDE 9 MG/ML
INJECTION, SOLUTION INTRAVENOUS
Status: COMPLETED
Start: 2019-05-16 | End: 2019-05-16

## 2019-05-16 RX ORDER — SUCCINYLCHOLINE CHLORIDE 20 MG/ML
INJECTION INTRAMUSCULAR; INTRAVENOUS PRN
Status: DISCONTINUED | OUTPATIENT
Start: 2019-05-16 | End: 2019-05-16 | Stop reason: SDUPTHER

## 2019-05-16 RX ORDER — LABETALOL HYDROCHLORIDE 5 MG/ML
5 INJECTION, SOLUTION INTRAVENOUS EVERY 10 MIN PRN
Status: DISCONTINUED | OUTPATIENT
Start: 2019-05-16 | End: 2019-05-16 | Stop reason: HOSPADM

## 2019-05-16 RX ORDER — OXYCODONE HYDROCHLORIDE AND ACETAMINOPHEN 5; 325 MG/1; MG/1
1 TABLET ORAL
Status: DISCONTINUED | OUTPATIENT
Start: 2019-05-16 | End: 2019-05-16 | Stop reason: HOSPADM

## 2019-05-16 RX ORDER — MAGNESIUM HYDROXIDE 1200 MG/15ML
LIQUID ORAL CONTINUOUS PRN
Status: COMPLETED | OUTPATIENT
Start: 2019-05-16 | End: 2019-05-16

## 2019-05-16 RX ORDER — DOXAZOSIN MESYLATE 4 MG/1
4 TABLET ORAL NIGHTLY
Status: DISCONTINUED | OUTPATIENT
Start: 2019-05-16 | End: 2019-05-17

## 2019-05-16 RX ORDER — SODIUM CHLORIDE, SODIUM LACTATE, POTASSIUM CHLORIDE, CALCIUM CHLORIDE 600; 310; 30; 20 MG/100ML; MG/100ML; MG/100ML; MG/100ML
INJECTION, SOLUTION INTRAVENOUS CONTINUOUS
Status: DISCONTINUED | OUTPATIENT
Start: 2019-05-16 | End: 2019-05-16

## 2019-05-16 RX ORDER — GLYCOPYRROLATE 0.2 MG/ML
INJECTION INTRAMUSCULAR; INTRAVENOUS PRN
Status: DISCONTINUED | OUTPATIENT
Start: 2019-05-16 | End: 2019-05-16 | Stop reason: SDUPTHER

## 2019-05-16 RX ORDER — SODIUM CHLORIDE 0.9 % (FLUSH) 0.9 %
10 SYRINGE (ML) INJECTION PRN
Status: DISCONTINUED | OUTPATIENT
Start: 2019-05-16 | End: 2019-05-16 | Stop reason: HOSPADM

## 2019-05-16 RX ORDER — ACETAMINOPHEN 80 MG
TABLET,CHEWABLE ORAL
Status: DISPENSED
Start: 2019-05-16 | End: 2019-05-16

## 2019-05-16 RX ORDER — LIDOCAINE HYDROCHLORIDE 10 MG/ML
1 INJECTION, SOLUTION EPIDURAL; INFILTRATION; INTRACAUDAL; PERINEURAL
Status: DISCONTINUED | OUTPATIENT
Start: 2019-05-16 | End: 2019-05-16 | Stop reason: HOSPADM

## 2019-05-16 RX ORDER — LIDOCAINE HYDROCHLORIDE 20 MG/ML
INJECTION, SOLUTION EPIDURAL; INFILTRATION; INTRACAUDAL; PERINEURAL PRN
Status: DISCONTINUED | OUTPATIENT
Start: 2019-05-16 | End: 2019-05-16 | Stop reason: SDUPTHER

## 2019-05-16 RX ORDER — CARVEDILOL 6.25 MG/1
6.25 TABLET ORAL 2 TIMES DAILY WITH MEALS
Status: DISCONTINUED | OUTPATIENT
Start: 2019-05-16 | End: 2019-05-17

## 2019-05-16 RX ORDER — PROCHLORPERAZINE EDISYLATE 5 MG/ML
5 INJECTION INTRAMUSCULAR; INTRAVENOUS
Status: DISCONTINUED | OUTPATIENT
Start: 2019-05-16 | End: 2019-05-16 | Stop reason: HOSPADM

## 2019-05-16 RX ADMIN — LIDOCAINE HYDROCHLORIDE 100 MG: 20 INJECTION, SOLUTION EPIDURAL; INFILTRATION; INTRACAUDAL; PERINEURAL at 11:45

## 2019-05-16 RX ADMIN — GLYCOPYRROLATE 0.2 MG: 0.2 INJECTION INTRAMUSCULAR; INTRAVENOUS at 11:54

## 2019-05-16 RX ADMIN — SODIUM CHLORIDE: 9 INJECTION, SOLUTION INTRAVENOUS at 11:38

## 2019-05-16 RX ADMIN — AMLODIPINE BESYLATE 10 MG: 5 TABLET ORAL at 15:07

## 2019-05-16 RX ADMIN — Medication 10 ML: at 15:17

## 2019-05-16 RX ADMIN — CLOPIDOGREL 75 MG: 75 TABLET, FILM COATED ORAL at 15:07

## 2019-05-16 RX ADMIN — PREDNISONE 10 MG: 10 TABLET ORAL at 15:07

## 2019-05-16 RX ADMIN — GLYCOPYRROLATE 0.2 MG: 0.2 INJECTION INTRAMUSCULAR; INTRAVENOUS at 11:50

## 2019-05-16 RX ADMIN — DOXAZOSIN 4 MG: 4 TABLET ORAL at 21:14

## 2019-05-16 RX ADMIN — ENOXAPARIN SODIUM 40 MG: 40 INJECTION SUBCUTANEOUS at 21:14

## 2019-05-16 RX ADMIN — Medication 10 ML: at 21:15

## 2019-05-16 RX ADMIN — SENNOSIDES AND DOCUSATE SODIUM 2 TABLET: 8.6; 5 TABLET ORAL at 15:15

## 2019-05-16 RX ADMIN — SENNOSIDES AND DOCUSATE SODIUM 2 TABLET: 8.6; 5 TABLET ORAL at 21:14

## 2019-05-16 RX ADMIN — FENOFIBRATE 160 MG: 160 TABLET ORAL at 15:07

## 2019-05-16 RX ADMIN — CARVEDILOL 6.25 MG: 6.25 TABLET, FILM COATED ORAL at 16:54

## 2019-05-16 RX ADMIN — SUCCINYLCHOLINE CHLORIDE 160 MG: 20 INJECTION, SOLUTION INTRAMUSCULAR; INTRAVENOUS at 11:45

## 2019-05-16 RX ADMIN — METOPROLOL TARTRATE 12.5 MG: 25 TABLET, FILM COATED ORAL at 08:09

## 2019-05-16 RX ADMIN — FINASTERIDE 5 MG: 5 TABLET, FILM COATED ORAL at 16:54

## 2019-05-16 RX ADMIN — CEFAZOLIN SODIUM: 2 INJECTION, SOLUTION INTRAVENOUS at 11:30

## 2019-05-16 RX ADMIN — GLYCOPYRROLATE 0.2 MG: 0.2 INJECTION INTRAMUSCULAR; INTRAVENOUS at 11:59

## 2019-05-16 RX ADMIN — FENTANYL CITRATE 100 MCG: 50 INJECTION, SOLUTION INTRAMUSCULAR; INTRAVENOUS at 11:45

## 2019-05-16 RX ADMIN — ONDANSETRON 4 MG: 2 INJECTION INTRAMUSCULAR; INTRAVENOUS at 11:45

## 2019-05-16 RX ADMIN — CEFAZOLIN SODIUM 2 G: 2 INJECTION, SOLUTION INTRAVENOUS at 11:30

## 2019-05-16 RX ADMIN — PROPOFOL 150 MG: 10 INJECTION, EMULSION INTRAVENOUS at 11:45

## 2019-05-16 RX ADMIN — DEXAMETHASONE SODIUM PHOSPHATE 4 MG: 4 INJECTION, SOLUTION INTRAMUSCULAR; INTRAVENOUS at 11:45

## 2019-05-16 RX ADMIN — DESMOPRESSIN ACETATE 40 MG: 0.2 TABLET ORAL at 21:14

## 2019-05-16 RX ADMIN — LOSARTAN POTASSIUM 100 MG: 100 TABLET, FILM COATED ORAL at 15:07

## 2019-05-16 RX ADMIN — FENTANYL CITRATE 50 MCG: 50 INJECTION, SOLUTION INTRAMUSCULAR; INTRAVENOUS at 12:22

## 2019-05-16 ASSESSMENT — PAIN SCALES - GENERAL
PAINLEVEL_OUTOF10: 0

## 2019-05-16 ASSESSMENT — PULMONARY FUNCTION TESTS
PIF_VALUE: 23
PIF_VALUE: 1
PIF_VALUE: 24
PIF_VALUE: 23
PIF_VALUE: 22
PIF_VALUE: 3
PIF_VALUE: 16
PIF_VALUE: 2
PIF_VALUE: 0
PIF_VALUE: 24
PIF_VALUE: 25
PIF_VALUE: 23
PIF_VALUE: 3
PIF_VALUE: 1
PIF_VALUE: 24
PIF_VALUE: 22
PIF_VALUE: 0
PIF_VALUE: 24
PIF_VALUE: 19
PIF_VALUE: 26
PIF_VALUE: 1
PIF_VALUE: 26
PIF_VALUE: 22
PIF_VALUE: 1
PIF_VALUE: 24
PIF_VALUE: 22
PIF_VALUE: 24
PIF_VALUE: 24
PIF_VALUE: 23
PIF_VALUE: 2
PIF_VALUE: 2
PIF_VALUE: 0
PIF_VALUE: 1
PIF_VALUE: 19
PIF_VALUE: 26
PIF_VALUE: 26
PIF_VALUE: 23
PIF_VALUE: 1
PIF_VALUE: 20
PIF_VALUE: 23
PIF_VALUE: 2
PIF_VALUE: 22
PIF_VALUE: 20
PIF_VALUE: 0
PIF_VALUE: 1
PIF_VALUE: 19
PIF_VALUE: 0
PIF_VALUE: 15
PIF_VALUE: 2
PIF_VALUE: 22
PIF_VALUE: 5
PIF_VALUE: 23

## 2019-05-16 ASSESSMENT — PAIN - FUNCTIONAL ASSESSMENT: PAIN_FUNCTIONAL_ASSESSMENT: 0-10

## 2019-05-16 ASSESSMENT — ENCOUNTER SYMPTOMS: SHORTNESS OF BREATH: 0

## 2019-05-16 NOTE — ANESTHESIA POSTPROCEDURE EVALUATION
Department of Anesthesiology  Postprocedure Note    Patient: Vonda Meza  MRN: [id number]  YOB: 1950  Date of evaluation: 5/16/2019  Time:  12:52 PM     Procedure Summary     Date:  05/16/19 Room / Location:  [facility] OR  / [facility] OR    Anesthesia Start:  1138 Anesthesia Stop:  9067    Procedure:  [address] LEFT URETEROSCOPY HOLMIUM LASER LITHOTRIPSY, STONE BASKET, STONE MANIPULATIONAND EXTRACTION,HOLMIUM LASER LITHOTRIPSY OF BLADDER STONES (Left ) Diagnosis:  (LEFT RENAL CALCULUS , BLADDER STONE)    Surgeon:  Estee Penaloza MD Responsible Provider:  Marbin Cotton MD    Anesthesia Type:  general ASA Status:  3          Anesthesia Type: general    Aiwlda Phase I: Awilda Score: 8    Awilda Phase II:      Last vitals: Reviewed and per EMR flowsheets.        Anesthesia Post Evaluation    Patient location during evaluation: PACU  Patient participation: complete - patient participated  Level of consciousness: awake  Airway patency: patent  Nausea & Vomiting: no nausea and no vomiting  Complications: no  Cardiovascular status: blood pressure returned to baseline  Respiratory status: acceptable  Hydration status: euvolemic

## 2019-05-16 NOTE — ANESTHESIA PRE PROCEDURE
Department of Anesthesiology  Preprocedure Note       Name:  Ashley Garcia   Age:  80 y.o.  :  1950                                          MRN:  [id number]         Date:  2019      Surgeon: Sandra Reyes):  Maria E Alberto MD    Procedure: CYSTOSCOPY LEFT URETEROSCOPY HOLMIUM LASER LITHOTRIPSY, STONE BASKET, STONE MANIPULATION, POSSIBLE LEFT STENT PLACEMENT (Left )    Medications prior to admission:   Prior to Admission medications    Medication Sig Start Date End Date Taking? Authorizing Provider   losartan (COZAAR) 100 MG tablet Take 100 mg by mouth daily   Yes Historical Provider, MD   doxazosin (CARDURA) 4 MG tablet Take 4 mg by mouth nightly   Yes Historical Provider, MD   amLODIPine (NORVASC) 5 MG tablet Take 5 mg by mouth daily   Yes Historical Provider, MD   fenofibrate 160 MG tablet Take 160 mg by mouth daily   Yes Historical Provider, MD   clopidogrel (PLAVIX) 75 MG tablet Take 75 mg by mouth daily   Yes Historical Provider, MD   atorvastatin (LIPITOR) 40 MG tablet Take 40 mg by mouth daily   Yes Historical Provider, MD   metoprolol tartrate (LOPRESSOR) 50 MG tablet Take 50 mg by mouth 2 times daily   Yes Historical Provider, MD   cyclobenzaprine (FLEXERIL) 10 MG tablet Take 10 mg by mouth 3 times daily as needed for Muscle spasms   Yes Historical Provider, MD   predniSONE (DELTASONE) 5 MG tablet Take 5 mg by mouth See Admin Instructions Per prednisone taper instructions.    Yes Historical Provider, MD       Current medications:    Current Facility-Administered Medications   Medication Dose Route Frequency Provider Last Rate Last Dose    pill splitter             sodium chloride 0.9 % infusion             atorvastatin (LIPITOR) tablet 40 mg  40 mg Oral Nightly June Feliciano MD   40 mg at 05/15/19 2059    clopidogrel (PLAVIX) tablet 75 mg  75 mg Oral Daily June Feliciano MD   75 mg at 05/15/19 140    cyclobenzaprine (FLEXERIL) tablet 10 mg  10 mg Oral TID PRN Micki Cooper MD        fenofibrate tablet 160 mg  160 mg Oral Daily Micki Cooper MD   160 mg at 05/15/19 1402    losartan (COZAAR) tablet 100 mg  100 mg Oral Daily Micki Cooper MD   100 mg at 05/15/19 1403    sodium chloride flush 0.9 % injection 10 mL  10 mL Intravenous 2 times per day Micki Cooper MD   10 mL at 05/15/19 2059    sodium chloride flush 0.9 % injection 10 mL  10 mL Intravenous PRN Micki Cooper MD   10 mL at 05/15/19 1554    magnesium hydroxide (MILK OF MAGNESIA) 400 MG/5ML suspension 30 mL  30 mL Oral Daily PRN Micki Cooper MD   30 mL at 05/15/19 1846    ondansetron (ZOFRAN) injection 4 mg  4 mg Intravenous Q6H PRN Micki Cooper MD        enoxaparin (LOVENOX) injection 40 mg  40 mg Subcutaneous Nightly Micki Cooper MD   40 mg at 05/15/19 2059    0.9 % sodium chloride infusion   Intravenous Continuous Micki Cooper  mL/hr at 05/15/19 1413      acetaminophen (TYLENOL) tablet 650 mg  650 mg Oral Q4H PRN Micki Cooper MD        HYDROcodone-acetaminophen (NORCO) 5-325 MG per tablet 1 tablet  1 tablet Oral Q4H PRN Micki Cooper MD        Or    HYDROcodone-acetaminophen (NORCO) 5-325 MG per tablet 2 tablet  2 tablet Oral Q4H PRN Micik Cooper MD        morphine (PF) injection 2 mg  2 mg Intravenous Q2H PRN Micki Cooper MD   2 mg at 05/15/19 1412    Or    morphine injection 4 mg  4 mg Intravenous Q2H PRN Micki Cooper MD        tamsulosin (FLOMAX) capsule 0.4 mg  0.4 mg Oral Daily Micki Cooper MD        hydrALAZINE (APRESOLINE) injection 10 mg  10 mg Intravenous Q6H PRN Micki Cooper MD   10 mg at 05/15/19 1553    sennosides-docusate sodium (SENOKOT-S) 8.6-50 MG tablet 2 tablet  2 tablet Oral BID Micki Cooper MD   2 tablet at 05/15/19 2100    polyethylene glycol (GLYCOLAX) packet 17 g  17 g Oral Q72H Micki Cooper MD   17 g at 05/15/19 7993    predniSONE (DELTASONE) tablet 10 mg  10 mg Oral Daily Henry Xiao MD   10 mg at 05/15/19 1559    amLODIPine (NORVASC) tablet 10 mg  10 mg Oral Daily Henry Xiao MD        metoprolol tartrate (LOPRESSOR) tablet 12.5 mg  12.5 mg Oral BID Henry Xiao MD   12.5 mg at 05/16/19 0809       Allergies:     Allergies   Allergen Reactions    Asa [Aspirin] Anaphylaxis       Problem List:    Patient Active Problem List   Diagnosis Code    Kidney stone N20.0    CAD (coronary artery disease) I25.10    HTN (hypertension) I10    BPH (benign prostatic hyperplasia) N40.0    DDD (degenerative disc disease), lumbar M51.36    Constipation K59.00       Past Medical History:        Diagnosis Date    Hyperlipidemia     Hypertension        Past Surgical History:        Procedure Laterality Date    CARDIAC SURGERY         Social History:    Social History     Tobacco Use    Smoking status: Never Smoker    Smokeless tobacco: Never Used   Substance Use Topics    Alcohol use: Never     Frequency: Never                                Counseling given: Not Answered      Vital Signs (Current):   Vitals:    05/16/19 0030 05/16/19 0530 05/16/19 0730 05/16/19 0945   BP: 135/70 (!) 156/68 (!) 154/89 (!) 160/74   Pulse: 72 67 61 67   Resp: 17 17 16 18   Temp: 97.6 °F (36.4 °C) 97.6 °F (36.4 °C) 97.6 °F (36.4 °C) 97.6 °F (36.4 °C)   TempSrc: Temporal Temporal Temporal Temporal   SpO2:   98% 98%   Weight:  198 lb (89.8 kg)  198 lb (89.8 kg)   Height:    5' 9\" (1.753 m)                                              BP Readings from Last 3 Encounters:   05/16/19 (!) 160/74       NPO Status: Time of last liquid consumption: 0000                        Time of last solid consumption: 0000                        Date of last liquid consumption: 05/16/19                        Date of last solid food consumption: 05/16/19    BMI:   Wt Readings from Last 3 Encounters:   05/16/19 198 lb (89.8 kg)     Body mass index is discussed with patient whom. Plan discussed with CRNA. Plan for GETA with standard ASA monitoring. Additional monitoring and lines as dictated by intra-op course. Risks/benefits reviewed with patient and all anesthestic questions answered prior to procedure. NPO appropriate.       Az Mcintyre MD   5/16/2019

## 2019-05-16 NOTE — CARE COORDINATION
Discharge Planning Assessment  SW discharge planner met with patient to discuss reason for admission, current living situation, and potential needs at the time of discharge    Demographics/Insurance verified Yes/yes    Current type of dwelling: Private home with spouse Lives in [place]. Patient from ECF/SW confirmed with: n/a    Living arrangements: [place], [place]     Level of function/Support: Indepedent     PCP: PCP in [place]    Last Visit to PCP:    DME: no    Active with any community resources/agencies/skilled home care: no    Medication compliance issues: no    Financial issues that could impact healthcare: no    Transportation at the time of discharge: Yes- spouse    Tentative discharge plan:  Back to [place]- was only here visiting family.     Henrry Vega MSW, [address]

## 2019-05-16 NOTE — OP NOTE
Urology Operative Report  [facility]    Provider: Esther Greene MD Patient ID:  Admission Date: 5/15/2019 Name: Brigette Edward  OR Date: 2019  MRN: [id number]   Patient Location: OR/NONE : 1950  Attending: Deneen Gaytan MD Date of Service: 2019  PCP: No primary care provider on file. Date of Operation: 2019    Preoperative Diagnosis: LEFT side mid ureteral stone and bladder stones with BPH    Postoperative Diagnosis: same    Procedure:    1. Cystoscopy  2. Left side ureteroscopy  3. Laser lithotripsy of ureteral stone  4. Basket extraction of ureteral stone  5. Laser lithotripsy with irrigation clearance of bladder stones. 6. Fluoroscopic imaging < 1 hr physician time    Surgeon:   Esther Greene MD    Anesthesia: General endotracheal anesthesia    Indications: Brigette Edward is a 80 y.o. male who presents for the above named surgery. Informed consent was obtained and the risks, benefits, and details of the procedure were explained to the patient who elected to proceed. Details of Procedure: The patient was brought to the operating room and placed in the supine position on the operating room table. SCDs were placed on the lower extremities. Following induction of anesthesia the patient was positioned in a lithotomy position, all pressure points were padded, and the genitals were prepped and draped in the usual sterile fashion. A routine timeout was performed, confirming the patient, procedure, site, risk of fire, patient allergies and confirming that preoperative antibiotics had been administered prior to beginning. A 21 fr rigid cystoscope could not be passed into the meatus, so a 17 fr scope was used. The scope was advance via a normal appearing urethra. The Prostate channel had severe lateral lobe BPH with median lobe formation.  The bladder was inspected and there ceullule formation, with trabeculation and several bladder stones which

## 2019-05-17 VITALS
DIASTOLIC BLOOD PRESSURE: 87 MMHG | WEIGHT: 196.8 LBS | SYSTOLIC BLOOD PRESSURE: 151 MMHG | OXYGEN SATURATION: 97 % | HEART RATE: 58 BPM | TEMPERATURE: 97.5 F | BODY MASS INDEX: 29.15 KG/M2 | HEIGHT: 69 IN | RESPIRATION RATE: 18 BRPM

## 2019-05-17 LAB
EKG ATRIAL RATE: 51 BPM
EKG DIAGNOSIS: NORMAL
EKG P AXIS: 48 DEGREES
EKG P-R INTERVAL: 138 MS
EKG Q-T INTERVAL: 418 MS
EKG QRS DURATION: 114 MS
EKG QTC CALCULATION (BAZETT): 424 MS
EKG R AXIS: 21 DEGREES
EKG T AXIS: 30 DEGREES
EKG VENTRICULAR RATE: 62 BPM
HCT VFR BLD CALC: 37.3 % (ref 40.5–52.5)
HEMOGLOBIN: 12.5 G/DL (ref 13.5–17.5)
MCH RBC QN AUTO: 30.2 PG (ref 26–34)
MCHC RBC AUTO-ENTMCNC: 33.6 G/DL (ref 31–36)
MCV RBC AUTO: 89.9 FL (ref 80–100)
PDW BLD-RTO: 14.3 % (ref 12.4–15.4)
PLATELET # BLD: 256 K/UL (ref 135–450)
PMV BLD AUTO: 7.7 FL (ref 5–10.5)
RBC # BLD: 4.15 M/UL (ref 4.2–5.9)
WBC # BLD: 8 K/UL (ref 4–11)

## 2019-05-17 PROCEDURE — 93010 ELECTROCARDIOGRAM REPORT: CPT | Performed by: INTERNAL MEDICINE

## 2019-05-17 PROCEDURE — 6370000000 HC RX 637 (ALT 250 FOR IP): Performed by: FAMILY MEDICINE

## 2019-05-17 PROCEDURE — 6370000000 HC RX 637 (ALT 250 FOR IP): Performed by: UROLOGY

## 2019-05-17 PROCEDURE — 85027 COMPLETE CBC AUTOMATED: CPT

## 2019-05-17 RX ORDER — POLYETHYLENE GLYCOL 3350 17 G/17G
17 POWDER, FOR SOLUTION ORAL
Qty: 170 G | Refills: 0 | Status: SHIPPED | OUTPATIENT
Start: 2019-05-17 | End: 2019-06-16

## 2019-05-17 RX ORDER — DOXAZOSIN MESYLATE 4 MG/1
4 TABLET ORAL EVERY 12 HOURS SCHEDULED
Status: DISCONTINUED | OUTPATIENT
Start: 2019-05-17 | End: 2019-05-17 | Stop reason: HOSPADM

## 2019-05-17 RX ORDER — AMLODIPINE BESYLATE 5 MG/1
5 TABLET ORAL DAILY
Status: DISCONTINUED | OUTPATIENT
Start: 2019-05-18 | End: 2019-05-17 | Stop reason: HOSPADM

## 2019-05-17 RX ORDER — DOXAZOSIN MESYLATE 4 MG/1
4 TABLET ORAL EVERY 12 HOURS SCHEDULED
Status: DISCONTINUED | OUTPATIENT
Start: 2019-05-17 | End: 2019-05-17

## 2019-05-17 RX ADMIN — CLOPIDOGREL 75 MG: 75 TABLET, FILM COATED ORAL at 09:34

## 2019-05-17 RX ADMIN — FENOFIBRATE 160 MG: 160 TABLET ORAL at 09:34

## 2019-05-17 RX ADMIN — PREDNISONE 10 MG: 10 TABLET ORAL at 09:34

## 2019-05-17 RX ADMIN — LOSARTAN POTASSIUM 100 MG: 100 TABLET, FILM COATED ORAL at 09:34

## 2019-05-17 RX ADMIN — DOXAZOSIN 4 MG: 4 TABLET ORAL at 09:52

## 2019-05-17 ASSESSMENT — PAIN SCALES - GENERAL
PAINLEVEL_OUTOF10: 0

## 2019-05-17 NOTE — DISCHARGE SUMMARY
Hospital Discharge Summary    Patient's PCP: No primary care provider on file. Admit Date: 5/15/2019   Discharge Date: 5/17/2019    Admitting Physician: Dr. Micah King MD  Discharge Physician: Dr. Anastacio Mathews:   IP CONSULT TO HOSPITALIST  IP CONSULT TO UROLOGY    Brief HPI:   This is a pleasant 80 y.o. male who is in town from [place] for a graduation party, presented to the ER w/ a few wekk hx of left back pain, which progressively worsened last night. Pt was seen by his PCP for the back pain, had an xray revealing DDD l-spine and pt was started on flexeril and steroids which helped initially until last night when the pain was worse. He was found to have a left 3mm kidney stone w/ left hydronephrosis, also found to have prostamegaly on CT Scan. Brief hospital course:   Left Kidney Stone w/ left hydronephrosis  - Urology following, plan for OR tomm am  - flomax, IVF, prn pain meds        HTN  - BP variable, cont norvasc 10mg QD, losartan 100mg QD, coreg 6.25mg BID, doxazosin 4mg QD, prn hyralzine, CTM        CAD  - on plavix, statin, BB        BPH  - cont doxazosin and finasterie        DDD  - complete steroid course        Constipation  - cont laxatives       PT was switched back to his home HTN meds, he will need to f/u w/ his PCP in 1 week, will need to f/u w/ URology for prostate mass. Discharge Diagnoses: Active Problems:    Kidney stone    CAD (coronary artery disease)    HTN (hypertension)    BPH (benign prostatic hyperplasia)    DDD (degenerative disc disease), lumbar    Constipation  Resolved Problems:    * No resolved hospital problems. *      Physical Exam: /62   Pulse 50   Temp 98.4 °F (36.9 °C) (Temporal)   Resp 16   Ht 5' 9\" (1.753 m)   Wt 196 lb 12.8 oz (89.3 kg)   SpO2 95%   BMI 29.06 kg/m²   Gen/overall appearance: Not in acute distress. Alert.   Head: Normocephalic, atraumatic  Eyes: EOMI, good acuity  ENT:- Oral mucosa moist  Neck: No JVD, thyromegaly  CVS: Nml S1S2, no MRG, RRR  Pulm: Clear bilaterally. No crackles/wheezes  Gastrointestinal: Soft, NT/ND, +BS  Musculoskeletal: No edema. Warm  Neuro: No focal deficit. Moves extremity spontaneously. Psychiatry: Appropriate affect. Not agitated. Skin: Warm, dry with normal turgor. No rash        Significant diagnostic studies that may require follow up:  Ct Abdomen Pelvis W Iv Contrast Additional Contrast? None    Result Date: 5/15/2019  EXAMINATION: CT OF THE ABDOMEN AND PELVIS WITH CONTRAST 5/15/2019 9:40 am TECHNIQUE: CT of the abdomen and pelvis was performed with the administration of intravenous contrast. Multiplanar reformatted images are provided for review. Dose modulation, iterative reconstruction, and/or weight based adjustment of the mA/kV was utilized to reduce the radiation dose to as low as reasonably achievable. COMPARISON: None. HISTORY: ORDERING SYSTEM PROVIDED HISTORY: LLQ pain, L flank pain, eval for diverticulitis TECHNOLOGIST PROVIDED HISTORY: Additional Contrast?->None Ordering Physician Provided Reason for Exam: LLQ pain, L flank pain, eval for diverticulitis Acuity: Unknown Type of Exam: Unknown FINDINGS: Lower Chest: Visualized portions of the lower thorax are unremarkable. Organs: Liver, spleen, pancreas, and adrenal glands are unremarkable. No radiodense gallstones. Kidneys: 1.9 cm hypodensity in the superior right kidney demonstrates fluid attenuation and is compatible with a cyst.  No additional right kidney lesions. No right kidney stones. 3 mm stone in the mid left ureter with moderate left hydroureteronephrosis. No additional left kidney stones. No focal lesions in the left kidney. Moderate left perinephric stranding and fluid. GI/Bowel: No bowel obstruction. Normal appendix. Colonic diverticulosis without evidence of diverticulitis. Pelvis: Multiple layering bladder stones measuring up to 9 mm. Moderately enlarged prostate gland. Peritoneum/Retroperitoneum: Few prominent and mildly enlarged retroperitoneal lymph nodes are nonspecific. No free air. Bones/Soft Tissues: Degenerative changes of the lumbar spine. 3 mm stone in the mid left ureter with moderate left hydroureteronephrosis. Multiple layering bladder stones measuring up to 9 mm. Colonic diverticulosis without diverticulitis. Few mildly enlarged retroperitoneal lymph nodes measuring up to 1.2 cm in short axis are of unclear etiology. Recommend short-term follow-up CT in 2-3 months. Fl Retrograde Pyelogram Left    Result Date: 5/16/2019  EXAMINATION: SPOT FLUOROSCOPIC IMAGES 5/16/2019 10:50 am TECHNIQUE: Fluoroscopy was provided by the radiology department for procedure. Radiologist was not present during examination. FLUOROSCOPY DOSE AND TYPE OR TIME AND EXPOSURES: 1 image at 7 seconds COMPARISON: 05/15/2019 HISTORY: Intraprocedural imaging. FINDINGS: 6 spot images of the left abdomen were obtained intraoperatively status post left ureteral stent. A filling defect is present within the left mid ureter likely due to the known nephrolithiasis. .     Intraprocedural fluoroscopic spot images as above. See separate procedure report for more information. Treatments: As above.       Discharge Medications:     Medication List      CONTINUE taking these medications    amLODIPine 5 MG tablet  Commonly known as:  NORVASC     atorvastatin 40 MG tablet  Commonly known as:  LIPITOR     clopidogrel 75 MG tablet  Commonly known as:  PLAVIX     cyclobenzaprine 10 MG tablet  Commonly known as:  FLEXERIL     doxazosin 4 MG tablet  Commonly known as:  CARDURA     fenofibrate 160 MG tablet     losartan 100 MG tablet  Commonly known as:  COZAAR     metoprolol tartrate 50 MG tablet  Commonly known as:  LOPRESSOR     predniSONE 5 MG tablet  Commonly known as:  DELTASONE            Activity: activity as tolerated  Diet: DIET GENERAL;      Disposition: home  Discharged Condition:

## 2019-05-17 NOTE — PROGRESS NOTES
4 Eyes Skin Assessment     The patient is being assess for  Admission    I agree that 2 RN's have performed a thorough Head to Toe Skin Assessment on the patient. ALL assessment sites listed below have been assessed. Areas assessed by both nurses: Jordyn/Tammy  [x]   Head, Face, and Ears   [x]   Shoulders, Back, and Chest  [x]   Arms, Elbows, and Hands   [x]   Coccyx, Sacrum, and IschIum  [x]   Legs, Feet, and Heels        Does the Patient have Skin Breakdown?   No         Stephen Prevention initiated:  NA   Wound Care Orders initiated:  NA      [facility] nurse consulted for Pressure Injury (Stage 3,4, Unstageable, DTI, NWPT, and Complex wounds), New and Established Ostomies:  NA      Nurse 1 eSignature: Electronically signed by Basilio Mariscal RN on 5/15/19 at 1:43 PM    **SHARE this note so that the co-signing nurse is able to place an eSignature**    Nurse 2 eSignature: Electronically signed by Ahsan Peck RN on 5/15/19 at 1:44 PM
Called and spoke with Estuardo Kasper in lab and notified her that Pt is a lab collect, someone accidentally put in that Pt was a unit collect yesterday.
Data- discharge order received, pt verbalized agreement to discharge, disposition to previous residence, no needs for HHC/DME. Action- discharge instructions prepared and given to pt, pt verbalized understanding. Medication information packet given r/t NEW and/or CHANGED prescriptions emphasizing name/purpose/side effects, pt verbalized understanding. Discharge instruction summary: Diet- general, Activity- as toklerated, Primary Care Physician as follows: No primary care provider on file. None f/u appointment in 1 week with pcp, pt states he has one to follow up with., , prescription medications filled in outpt pharmacy. Inpatient surgical procedure precautions reviewed: post-lithotripsy CHF Education reviewed. Pt/ Family has had a total of 60 minutes CHF education this admission encounter. Response- Pt belongings gathered, IV removed. Disposition is home (no HHC/DME needs), transported with wife, taken to lobby via walking, no complications.
Dr. Rabia Mullins in to see patient. All patient questions answered.   Mook Has RN
MT notified this RN and stated pt looks like he is flipping in and out of afib on telemetry monitor. HR 70. Pt asymptomatic. STAT EKG ordered. Will monitor.
Patients pre-op assessment, checklist, H&P, and problem list reviewed during patient interview prior to going to surgery.
Pt admitted to room 3325. Pt A&O x4. VSS. Oriented pt to room and call light. Pt v/u to call out with any needs. Call light and bedside table within reach.  Will monitor
Pt back to room 3325 from surgery. VSS. Will monitor.
Pt off floor to surgery via bed. Wife at bedside. Consent signed and in chart.
Teaching / education initiated regarding perioperative experience, expectations, and pain management during stay. Patient verbalized understanding.
The history and physical was reviewed. The patient was seen and examined in pre-op and his LEFT side was marked with his participation. He had a chance to ask questions which were answered. There has been no interval change. Plan to continue to the OR for ureteroscopy.     Kaelyn Morales  5/16/2019
Urology Progress Note  [facility]    Provider: Nando Julian MD Patient ID:  Admission Date: 5/15/2019 Name: Danish Lewis  OR Date: 5/15/2019 MRN: [id number]   Patient Location: [facility]-3325/3325-01 : 1950  Attending: Josie Jack MD Date of Service: 2019  PCP: No primary care provider on file. Diagnoses:  Ureteral stones  Bladder stones  BPH    Assessment/Plan:  -continue doxazosin  -OK for discharge  -follow up with urologist at home    The patient had a chance to ask questions which were answered. he understands the above plan. Subjective:   Danish Lewis is a 80 y.o. male. He was seen and examined this morning. Today we discussed going home. He is feeling much better and is appreciative of the care. He has been voiding well. We discussed his doxazosin.       Objective:   Vitals:  Vitals:    19 0925   BP: (!) 151/87   Pulse: 58   Resp: 18   Temp: 97.5 °F (36.4 °C)   SpO2: 97%       Intake/Output Summary (Last 24 hours) at 2019 1030  Last data filed at 2019 1326  Gross per 24 hour   Intake 600 ml   Output --   Net 600 ml     Physical Exam:  Gen: Alert and oriented x3, no acute distress  Resp: unlabored respirations    Labs:  Lab Results   Component Value Date    WBC 8.0 2019    HGB 12.5 (L) 2019    HCT 37.3 (L) 2019    MCV 89.9 2019     2019     Lab Results   Component Value Date    CREATININE 1.0 2019    BUN 17 2019     2019    K 4.0 2019     2019    CO2 24 2019       Nando Julian MD  2019
VSS,Phase 1 discharge criteria met--seen by anesthesia.    Transferred to 3A
Void 100 cc clear yellow urine with no difficulty.   June Baltazar RN
acetaminophen **OR** HYDROcodone 5 mg - acetaminophen, morphine **OR** morphine, hydrALAZINE    Labs/imaging:  CBC:   Recent Labs     05/15/19  0851 05/16/19  0459   WBC 9.9 9.7   HGB 14.7 12.8*    262         BMP:    Recent Labs     05/15/19  0851 05/16/19  0459    140   K 4.1 4.0    107   CO2 23 24   BUN 27* 17   CREATININE 1.2 1.0   GLUCOSE 168* 121*         Hepatic:   Recent Labs     05/15/19  0851   AST 23   ALT 28   BILITOT 0.3   ALKPHOS 53       Troponin: No results for input(s): TROPONINI in the last 72 hours. BNP: No results for input(s): BNP in the last 72 hours. INR: No results for input(s): INR in the last 72 hours. Reviewed imaging and reports noted      Assessment:  Active Problems:    Kidney stone    CAD (coronary artery disease)    HTN (hypertension)    BPH (benign prostatic hyperplasia)    DDD (degenerative disc disease), lumbar    Constipation  Resolved Problems:    * No resolved hospital problems. *        Plan:  Left Kidney Stone w/ left hydronephrosis  - Urology following, plan for OR tomm am  - flomax, IVF, prn pain meds        HTN  - BP variable, cont norvasc 10mg QD, losartan 100mg QD, coreg 6.25mg BID, doxazosin 4mg QD, prn hyralzine, CTM        CAD  - on plavix, statin, BB        BPH  - cont doxazosin and finasterie        DDD  - complete steroid course        Constipation  - cont laxatives        Dispo: DC likely tomm if continues to improve. Diet: DIET GENERAL;     Activity: up as tolerated  Prophylaxis: lovenox    Code status: Full Code     ----------        Kristal Calderon MD  -------------------------------  Marianela hospitalist

## 2019-05-17 NOTE — PLAN OF CARE
Pt denying pain at beginning of shift. Pt instructed to call RN if and when he needs pain medication. Call light within reach, will continue to monitor.

## 2019-05-20 LAB
CALCULI COMPOSITION: NORMAL
MASS: 208 MG
STONE DESCRIPTION: NORMAL
STONE NUMBER: NORMAL
STONE SIZE: NORMAL MM

## (undated) DEVICE — CYSTO/BLADDER IRRIGATION SET, REGULATING CLAMP

## (undated) DEVICE — CYSTO PACK: Brand: MEDLINE INDUSTRIES, INC.

## (undated) DEVICE — BAG DRNGE L6FT 20L PREFIL ABSRB POLYMER EXP TBNG DISP FOR

## (undated) DEVICE — URETERAL DILATOR

## (undated) DEVICE — GLOVE ORANGE PI 7   MSG9070

## (undated) DEVICE — CATHETER URET 5FR L70CM OPN END SGL LUMN INJ HUB FLEXIMA

## (undated) DEVICE — GUIDEWIRE ENDOSCP L150CM DIA0.035IN TIP 3CM PTFE NIT

## (undated) DEVICE — NITINOL STONE RETRIEVAL BASKET: Brand: ZERO TIP

## (undated) DEVICE — SYRINGE MED 10ML SLIP TIP BLNT FILL AND LUERLOCK DISP

## (undated) DEVICE — Device: Brand: MEDEX

## (undated) DEVICE — TRAY PREP DRY W/ PREM GLV 2 APPL 6 SPNG 2 UNDPD 1 OVERWRAP

## (undated) DEVICE — SOLUTION IV IRRIG WATER 1000ML POUR BRL 2F7114

## (undated) DEVICE — 35 ML SYRINGE LUER-LOCK TIP: Brand: MONOJECT

## (undated) DEVICE — Z DUP USE 2522782 SOLUTION IRRIG 1000ML STRL H2O PLAS CONTAINER UROMATIC

## (undated) DEVICE — Device